# Patient Record
Sex: FEMALE | Race: WHITE | NOT HISPANIC OR LATINO | URBAN - METROPOLITAN AREA
[De-identification: names, ages, dates, MRNs, and addresses within clinical notes are randomized per-mention and may not be internally consistent; named-entity substitution may affect disease eponyms.]

---

## 2024-10-01 ENCOUNTER — APPOINTMENT (RX ONLY)
Dept: URBAN - METROPOLITAN AREA CLINIC 116 | Facility: CLINIC | Age: 49
Setting detail: DERMATOLOGY
End: 2024-10-01

## 2024-10-01 DIAGNOSIS — I78.8 OTHER DISEASES OF CAPILLARIES: ICD-10-CM

## 2024-10-01 DIAGNOSIS — D18.0 HEMANGIOMA: ICD-10-CM

## 2024-10-01 DIAGNOSIS — D22 MELANOCYTIC NEVI: ICD-10-CM

## 2024-10-01 DIAGNOSIS — L81.4 OTHER MELANIN HYPERPIGMENTATION: ICD-10-CM

## 2024-10-01 PROBLEM — D18.01 HEMANGIOMA OF SKIN AND SUBCUTANEOUS TISSUE: Status: ACTIVE | Noted: 2024-10-01

## 2024-10-01 PROBLEM — D22.5 MELANOCYTIC NEVI OF TRUNK: Status: ACTIVE | Noted: 2024-10-01

## 2024-10-01 PROCEDURE — ? COUNSELING

## 2024-10-01 PROCEDURE — ? SUNSCREEN RECOMMENDATIONS

## 2024-10-01 PROCEDURE — ? DIAGNOSIS COMMENT

## 2024-10-01 PROCEDURE — 99203 OFFICE O/P NEW LOW 30 MIN: CPT

## 2024-10-01 ASSESSMENT — LOCATION DETAILED DESCRIPTION DERM
LOCATION DETAILED: PERIUMBILICAL SKIN
LOCATION DETAILED: UPPER STERNUM
LOCATION DETAILED: RIGHT MEDIAL UPPER BACK
LOCATION DETAILED: LEFT MEDIAL MALAR CHEEK

## 2024-10-01 ASSESSMENT — LOCATION ZONE DERM
LOCATION ZONE: FACE
LOCATION ZONE: TRUNK

## 2024-10-01 ASSESSMENT — LOCATION SIMPLE DESCRIPTION DERM
LOCATION SIMPLE: ABDOMEN
LOCATION SIMPLE: RIGHT UPPER BACK
LOCATION SIMPLE: CHEST
LOCATION SIMPLE: LEFT CHEEK

## 2024-10-01 NOTE — PROCEDURE: DIAGNOSIS COMMENT
Detail Level: Zone
Comment: Discussed having laser treatment done to remove
Render Risk Assessment In Note?: yes

## 2024-10-01 NOTE — PROCEDURE: SUNSCREEN RECOMMENDATIONS

## 2024-12-17 ENCOUNTER — APPOINTMENT (OUTPATIENT)
Dept: URBAN - METROPOLITAN AREA CLINIC 116 | Facility: CLINIC | Age: 49
Setting detail: DERMATOLOGY
End: 2024-12-17

## 2024-12-17 DIAGNOSIS — Z41.9 ENCOUNTER FOR PROCEDURE FOR PURPOSES OTHER THAN REMEDYING HEALTH STATE, UNSPECIFIED: ICD-10-CM

## 2024-12-17 PROCEDURE — ? COSMETIC CONSULTATION: SCITON MOXI

## 2024-12-17 PROCEDURE — ? COSMETIC CONSULTATION: BBL

## 2024-12-17 PROCEDURE — ? ADDITIONAL NOTES

## 2024-12-17 NOTE — PROCEDURE: ADDITIONAL NOTES
Render Risk Assessment In Note?: no
Detail Level: Simple
Additional Notes: What to Expect:\\n\\nThe treated area may be warm for 1-2 hours after the treatment. Warmth may continue for 12-24 hours after the treatment.\\n\\nWhat to Do:\\n\\nCold compresses may provide comfort during this time. Also, a mineral water spray might provide some relief and much needed moisture to the skin.\\n\\nWhat to Expect:\\n\\nRedness is normal and expected. Redness can persist for up to 7 days depending on the intensity of treatment.\\n\\nWhat to Do:\\n\\nUse gentle cleansers and keep your skin moisturized and out of the sun which will allow your skin time to heal and limit further stress on your skin.\\n\\nWhat to Expect: \\n\\nMENDs (microscopic epidermal necrotic debris) will appear on the 2nd or 3rd day after treatment as tiny dark spots and bronzed appearance to the treated skin.\\n\\nWhat to Do:\\n\\nMENDs are part of the healing process where treated tissue is working its way out of your body as new fresh skin is regenerated. During this time, your skin will be very dry and feel like sandpaper before flaking and peeling off. Keep your skin well moisturized to support the healing process. Do not pick at your skin.\\n\\nWhat to Expect:\\n\\nSwelling can occur and is typically expected immediately after treatment.\\n\\nWhat to Do:\\n\\nUse of a cold compress will help to relieve the swelling. To avoid further swelling, you may choose to sleep in more upright position the first 2-3 nights after the treatment. The first morning post treatment is when swelling is more prevalent, especially under the eyes. Swelling may last 2-4 days.\\n\\n\\n\\n\\nAfter Your Treatment\\n\\nThese pre- & post-care instructions are intended to guide you through the\\ntreatment process and get you on your way to [facility]!\\n\\nCLEANSING:\\nCleanse the skin two times a day with plain, lukewarm water and a gentle\\ncleanser. Use your hands and fingertips to cleanse using gentle patting motions.\\nDO NOT rub, scrub, use an exfoliant soap or skin care brush, e.g. Clarisonic in\\nthe treated area. Your specialist recommends Alastin Ultra Calm Cleansing\\nCream.\\n\\nRECOVERY:\\nAssist in the regenerating process by applying Alastin Regenerating Skin\\nNectar two times a day to calm post-procedure skin and help reduce recovery\\ntime.\\n\\nMOISTURIZER:\\nMoisturizer should be applied generously with clean hands over treated area\\nand reapplied whenever your skin feels dry. Your specialist recommends\\nAlastin Ultra-Light Moisturizer.\\n\\nSUNSCREEN:\\nSunscreen is a MUST and should be used daily beginning the day of treatment\\nand used consistently. Use sunscreen with Broadband UVA and UVB protection\\nand a SPF of at least 30. Ensure to reapply during sun exposure. DO NOT\\nexpose your skin to direct sun exposure for 14 days. The treated area is more\\nprone to sunburn and pigmentation change. Your specialist recommends\\nAlastin [facility] Sunscreen
Additional Notes: Pre BBL Instructions \\n\\nNo natural sun exposure, use of tanning beds, use of tanning lotions and/or spray tan application for 4 weeks prior to treatment  \\n\\nSome medications or supplements may increase your risk of bruising. Arnica is recommended to decrease the likelihood of bruising \\n\\nAvoid products containing any exfoliating agents (retinoic acid, tretinoin, retinol, benzoyl peroxide, glycolic acid, salicylic acid, astringents, etc. for 3 days prior to treatment) \\n\\nIf you have any history of herpes or cold sores, we highly recommend a course of anti-viral medication pre and post treatment. This will prevent an outbreak of cold sores if you are prone to them \\n\\nYour provider will recommend appropriate products to help enhance results and minimize potential side effects  \\n\\n \\n\\nPost BBL Instructions \\n\\nBruising, redness and/or swelling are common and will resolve in 7-10 days \\n\\nMineral makeup may be applied immediately following treatment \\n\\nThe pigment treated will turn darker (brown to black) within 24-48 hours \\n\\nDo not pick at the treated areas as this will most likely cause scarring. Dry areas should be kept hydrated with a gentle fragrance-free moisturizer \\n\\nWhen treating the face, the treated pigment will begin to shed off in approximately 1 week \\n\\nWhen treating the body, the treated pigment will begin to shed off in approximately 2-3 weeks \\n\\nAvoid sun exposure and use a broad-spectrum sunscreen \\n\\nAvoid heat, saunas, hot tubs and athletic activities that would cause sweating until skin is completely healed  \\n\\nAvoid products containing any exfoliating agents (retinoic acid, tretinoin, retinol, benzoyl peroxide, glycolic acid, salicylic acid, astringents, etc. for 7 days after your treatment) \\n\\nYou may resume your regular skin care routine 7 days after your treatment

## 2025-06-09 ENCOUNTER — APPOINTMENT (OUTPATIENT)
Dept: URBAN - METROPOLITAN AREA CLINIC 328 | Facility: CLINIC | Age: 50
Setting detail: DERMATOLOGY
End: 2025-06-09

## 2025-06-09 DIAGNOSIS — L44.8 OTHER SPECIFIED PAPULOSQUAMOUS DISORDERS: ICD-10-CM | Status: INADEQUATELY CONTROLLED

## 2025-06-09 PROCEDURE — ? COUNSELING

## 2025-06-09 PROCEDURE — ? ORDER TESTS

## 2025-06-09 PROCEDURE — ? PRESCRIPTION

## 2025-06-09 RX ORDER — KETOCONAZOLE 20 MG/ML
SHAMPOO, SUSPENSION TOPICAL BIW
Qty: 120 | Refills: 3 | Status: ERX | COMMUNITY
Start: 2025-06-09

## 2025-06-09 RX ORDER — FLUOCINOLONE ACETONIDE 0.11 MG/ML
OIL TOPICAL
Qty: 118.28 | Refills: 3 | Status: ERX | COMMUNITY
Start: 2025-06-09

## 2025-06-09 RX ADMIN — FLUOCINOLONE ACETONIDE: 0.11 OIL TOPICAL at 00:00

## 2025-06-09 RX ADMIN — KETOCONAZOLE: 20 SHAMPOO, SUSPENSION TOPICAL at 00:00

## 2025-06-09 ASSESSMENT — LOCATION DETAILED DESCRIPTION DERM: LOCATION DETAILED: MID-FRONTAL SCALP

## 2025-06-09 ASSESSMENT — LOCATION SIMPLE DESCRIPTION DERM: LOCATION SIMPLE: ANTERIOR SCALP

## 2025-06-09 ASSESSMENT — LOCATION ZONE DERM: LOCATION ZONE: SCALP

## 2025-06-09 NOTE — PROCEDURE: ORDER TESTS
Billing Type: Third-Party Bill
Expected Date Of Service: 06/09/2025
Performing Laboratory: 0
Bill For Surgical Tray: no

## 2025-07-11 ENCOUNTER — APPOINTMENT (OUTPATIENT)
Dept: URBAN - METROPOLITAN AREA CLINIC 328 | Facility: CLINIC | Age: 50
Setting detail: DERMATOLOGY
End: 2025-07-11

## 2025-07-11 DIAGNOSIS — L21.8 OTHER SEBORRHEIC DERMATITIS: ICD-10-CM | Status: INADEQUATELY CONTROLLED

## 2025-07-11 PROCEDURE — ? COUNSELING

## 2025-07-11 PROCEDURE — ? ADDITIONAL NOTES

## 2025-07-11 PROCEDURE — ? PRESCRIPTION

## 2025-07-11 RX ORDER — ROFLUMILAST 3 MG/G
AEROSOL, FOAM TOPICAL
Qty: 60 | Refills: 2 | Status: ERX | COMMUNITY
Start: 2025-07-11

## 2025-07-11 RX ORDER — FLUOCINOLONE ACETONIDE 0.11 MG/ML
OIL TOPICAL
Qty: 118.28 | Refills: 3 | Status: ERX

## 2025-07-11 RX ADMIN — ROFLUMILAST: 3 AEROSOL, FOAM TOPICAL at 00:00

## 2025-07-11 ASSESSMENT — LOCATION ZONE DERM: LOCATION ZONE: SCALP

## 2025-07-11 ASSESSMENT — LOCATION DETAILED DESCRIPTION DERM: LOCATION DETAILED: MID-FRONTAL SCALP

## 2025-07-11 ASSESSMENT — LOCATION SIMPLE DESCRIPTION DERM: LOCATION SIMPLE: ANTERIOR SCALP

## 2025-07-11 NOTE — PROCEDURE: ADDITIONAL NOTES
Render Risk Assessment In Note?: no
Detail Level: Simple
Additional Notes: Recommended patient to use dermat-oil 3 times a week and wash hair once daily if area of concern does not resolve in a month then we will proceed with punch bx

## 2025-08-12 ENCOUNTER — RX ONLY (RX ONLY)
Age: 50
End: 2025-08-12

## 2025-08-12 RX ORDER — TACROLIMUS 1 MG/G
OINTMENT TOPICAL
Qty: 60 | Refills: 0 | Status: ERX | COMMUNITY
Start: 2025-08-12